# Patient Record
Sex: MALE | Race: WHITE | NOT HISPANIC OR LATINO | URBAN - METROPOLITAN AREA
[De-identification: names, ages, dates, MRNs, and addresses within clinical notes are randomized per-mention and may not be internally consistent; named-entity substitution may affect disease eponyms.]

---

## 2022-03-23 ENCOUNTER — OFFICE VISIT (OUTPATIENT)
Dept: FAMILY MEDICINE CLINIC | Facility: CLINIC | Age: 47
End: 2022-03-23

## 2022-03-23 VITALS
RESPIRATION RATE: 20 BRPM | SYSTOLIC BLOOD PRESSURE: 162 MMHG | OXYGEN SATURATION: 94 % | HEIGHT: 67 IN | DIASTOLIC BLOOD PRESSURE: 102 MMHG | BODY MASS INDEX: 35.84 KG/M2 | TEMPERATURE: 98.7 F | WEIGHT: 228.38 LBS | HEART RATE: 92 BPM

## 2022-03-23 DIAGNOSIS — Z11.4 SCREENING FOR HIV (HUMAN IMMUNODEFICIENCY VIRUS): ICD-10-CM

## 2022-03-23 DIAGNOSIS — Z11.59 NEED FOR HEPATITIS C SCREENING TEST: ICD-10-CM

## 2022-03-23 DIAGNOSIS — Z13.220 SCREENING FOR LIPOID DISORDERS: ICD-10-CM

## 2022-03-23 DIAGNOSIS — Z13.1 SCREENING FOR DIABETES MELLITUS: ICD-10-CM

## 2022-03-23 DIAGNOSIS — Z76.89 ENCOUNTER TO ESTABLISH CARE: Primary | ICD-10-CM

## 2022-03-23 DIAGNOSIS — R03.0 ELEVATED BLOOD PRESSURE READING: ICD-10-CM

## 2022-03-23 PROCEDURE — 99203 OFFICE O/P NEW LOW 30 MIN: CPT | Performed by: FAMILY MEDICINE

## 2022-03-23 NOTE — ASSESSMENT & PLAN NOTE
BP elevated 162/102  Asymptomatic  Patient reports elevated reading in [facility] 1 year ago and was placed on a medication at that time  Discontinued medication because he felt he did not need it anymore      - Recommend patient to follow up in 2 weeks for another BP reading, will consider treatment at that time since [place] will [place] after [place]  - Increase PO fluids  - Recommend low salt diet    RTC in 2 weeks for follow up

## 2022-03-23 NOTE — ASSESSMENT & PLAN NOTE
Patient presents to establish care  History of abnormal lipid panel  Family history of diabetes in father      - Order lipid panel  - Order CMP  - Order HIV  - Order Hep C

## 2022-03-23 NOTE — PROGRESS NOTES
Family Medicine Office Visit  Sonny Side [address] 51 y o  male   MRN: [id number] : 1975  ENCOUNTER: 3/23/2022 4:02 PM    Assessment and Plan   Encounter to establish care  Patient presents to establish care  History of abnormal lipid panel  Family history of diabetes in father      - Order lipid panel  - Order CMP  - Order HIV  - Order Hep C  Elevated blood pressure reading  BP elevated 162/102  Asymptomatic  Patient reports elevated reading in [facility] 1 year ago and was placed on a medication at that time  Discontinued medication because he felt he did not need it anymore  - Recommend patient to follow up in 2 weeks for another BP reading, will consider treatment at that time since patient will be out of the country after that  - Increase PO fluids  - Recommend low salt diet    RTC in 2 weeks for follow up      Chief Complaint     Chief Complaint   Patient presents with   [address] Hypertension       History of Present Illness   Prabhjot Chong is a 51y o -year-old male who presents today to establish care  States that he feels well and has no current complaints  He reports that about 1 year ago he was seen by a doctor in [facility] and was found to have high triglycerides and cholesterol  Also reports his blood pressure being elevated at that time and was given a medication, but he only took it for a month  He does not take any medications on a daily basis and would like to have his cholesterol checked prior to his upcoming trip to [place]  Review of Systems   Review of Systems   Constitutional: Negative for activity change, appetite change, chills, fatigue and fever  HENT: Negative for congestion, rhinorrhea, sneezing and sore throat  Respiratory: Negative for cough, chest tightness, shortness of breath and wheezing  Cardiovascular: Negative for chest pain and palpitations     Gastrointestinal: Negative for abdominal pain, constipation, diarrhea, nausea and vomiting  Musculoskeletal: Negative for arthralgias, gait problem, myalgias and neck pain  Skin: Negative for rash  Neurological: Negative for dizziness, weakness, numbness and headaches  Active Problem List     Patient Active Problem List   Diagnosis    Encounter to establish care    Screening for diabetes mellitus    Screening for lipoid disorders    Elevated blood pressure reading       Past Medical History, Past Surgical History, Family History, and Social History were reviewed and updated today as appropriate  Objective   BP (!) 162/102 (BP Location: Right arm, Patient Position: Sitting, Cuff Size: Large)   Pulse 92   Temp 98 7 °F (37 1 °C) (Temporal)   Resp 20   Ht 5' 7" (1 702 m)   Wt 104 kg (228 lb 6 oz)   SpO2 94%   BMI 35 77 kg/m²     Physical Exam  Vitals reviewed  Constitutional:       General: He is not in acute distress  Appearance: Normal appearance  He is well-developed  He is not toxic-appearing or diaphoretic  HENT:      Head: Normocephalic and atraumatic  Nose: Nose normal       Mouth/Throat:      Pharynx: No oropharyngeal exudate  Eyes:      General: No scleral icterus  Right eye: No discharge  Left eye: No discharge  Conjunctiva/sclera: Conjunctivae normal    Cardiovascular:      Rate and Rhythm: Normal rate and regular rhythm  Pulses: Normal pulses  Heart sounds: Normal heart sounds  No murmur heard  Pulmonary:      Effort: Pulmonary effort is normal  No respiratory distress  Breath sounds: Normal breath sounds  No wheezing  Abdominal:      General: Bowel sounds are normal  There is no distension  Palpations: Abdomen is soft  Tenderness: There is no abdominal tenderness  Musculoskeletal:         General: No tenderness  Normal range of motion  Skin:     General: Skin is warm  Findings: No erythema or rash  Neurological:      Mental Status: He is alert     Psychiatric:         Mood and Affect: Mood normal          Behavior: Behavior normal            Pertinent Laboratory/Diagnostic Studies:  Lab Results   Component Value Date    GLUCOSE 95 06/02/2015    BUN 14 06/02/2015    CREATININE 0 64 06/02/2015    CALCIUM 9 0 06/02/2015     06/02/2015    K 4 2 06/02/2015    CO2 31 06/02/2015     06/02/2015     Lab Results   Component Value Date    ALT 41 06/02/2015    AST 24 06/02/2015    ALKPHOS 74 06/02/2015    BILITOT 0 4 06/02/2015       Lab Results   Component Value Date    WBC 5 86 06/02/2015    HGB 14 3 06/02/2015    HCT 43 2 06/02/2015    MCV 88 06/02/2015     06/02/2015       No results found for: TSH    No results found for: CHOL  No results found for: TRIG  No results found for: HDL  No results found for: LDLCALC  No results found for: HGBA1C    No results found for this or any previous visit  Orders Placed This Encounter   Procedures    Comprehensive metabolic panel    Lipid Panel with Direct LDL reflex    HIV 1/2 Antigen/Antibody (4th Generation) w Reflex [facility]    Hepatitis C antibody         Current Medications     No current outpatient medications on file  No current facility-administered medications for this visit         ALLERGIES:  No Known Allergies    Health Maintenance     Health Maintenance   Topic Date Due    Hepatitis C Screening  Never done    HIV Screening  Never done    BMI: Followup Plan  Never done    Annual Physical  Never done    DTaP,Tdap,and Td Vaccines (1 - Tdap) Never done    Influenza Vaccine (1) Never done    COVID-19 Vaccine (3 - Booster for Moderna series) 10/26/2021    Depression Screening  03/23/2023    BMI: Adult  03/23/2023    Pneumococcal Vaccine: Pediatrics (0 to 5 Years) and At-Risk Patients (6 to 59 Years)  Aged Out    HIB Vaccine  Aged Out    Hepatitis B Vaccine  Aged Out    IPV Vaccine  Aged Out    Hepatitis A Vaccine  Aged Out    Meningococcal ACWY Vaccine  Aged Out    HPV Vaccine  Aged [employer] History Administered Date(s) Administered    COVID-19 MODERNA VACC 0 5 ML IM 04/21/2021, 05/26/2021         Rosa Isela Glez MD   [address]  3/23/2022  4:02 PM    Parts of this note were dictated using [employer] dictation software and may have sounds-like errors due to variation in pronunciation